# Patient Record
Sex: MALE | Race: WHITE
[De-identification: names, ages, dates, MRNs, and addresses within clinical notes are randomized per-mention and may not be internally consistent; named-entity substitution may affect disease eponyms.]

---

## 2023-08-12 ENCOUNTER — HOSPITAL ENCOUNTER (OUTPATIENT)
Dept: HOSPITAL 47 - EC | Age: 28
Setting detail: OBSERVATION
LOS: 2 days | Discharge: HOME | End: 2023-08-14
Attending: INTERNAL MEDICINE | Admitting: INTERNAL MEDICINE
Payer: COMMERCIAL

## 2023-08-12 DIAGNOSIS — F17.200: ICD-10-CM

## 2023-08-12 DIAGNOSIS — J93.83: Primary | ICD-10-CM

## 2023-08-12 DIAGNOSIS — R00.0: ICD-10-CM

## 2023-08-12 LAB
ALBUMIN SERPL-MCNC: 4.7 G/DL (ref 3.5–5)
ALP SERPL-CCNC: 70 U/L (ref 38–126)
ALT SERPL-CCNC: 55 U/L (ref 4–49)
ANION GAP SERPL CALC-SCNC: 7 MMOL/L
APTT BLD: 23.7 SEC (ref 22–30)
AST SERPL-CCNC: 52 U/L (ref 17–59)
BASOPHILS # BLD AUTO: 0 K/UL (ref 0–0.2)
BASOPHILS NFR BLD AUTO: 0 %
BUN SERPL-SCNC: 15 MG/DL (ref 9–20)
CALCIUM SPEC-MCNC: 9.4 MG/DL (ref 8.4–10.2)
CHLORIDE SERPL-SCNC: 100 MMOL/L (ref 98–107)
CO2 SERPL-SCNC: 30 MMOL/L (ref 22–30)
EOSINOPHIL # BLD AUTO: 0.1 K/UL (ref 0–0.7)
EOSINOPHIL NFR BLD AUTO: 1 %
ERYTHROCYTE [DISTWIDTH] IN BLOOD BY AUTOMATED COUNT: 5.36 M/UL (ref 4.3–5.9)
ERYTHROCYTE [DISTWIDTH] IN BLOOD: 12.7 % (ref 11.5–15.5)
GLUCOSE SERPL-MCNC: 93 MG/DL (ref 74–99)
HCT VFR BLD AUTO: 48.6 % (ref 39–53)
HGB BLD-MCNC: 17 GM/DL (ref 13–17.5)
INR PPP: 1 (ref ?–1.2)
LIPASE SERPL-CCNC: 74 U/L (ref 23–300)
LYMPHOCYTES # SPEC AUTO: 2.2 K/UL (ref 1–4.8)
LYMPHOCYTES NFR SPEC AUTO: 27 %
MAGNESIUM SPEC-SCNC: 2 MG/DL (ref 1.6–2.3)
MCH RBC QN AUTO: 31.7 PG (ref 25–35)
MCHC RBC AUTO-ENTMCNC: 35 G/DL (ref 31–37)
MCV RBC AUTO: 90.6 FL (ref 80–100)
MONOCYTES # BLD AUTO: 0.5 K/UL (ref 0–1)
MONOCYTES NFR BLD AUTO: 6 %
NEUTROPHILS # BLD AUTO: 5.4 K/UL (ref 1.3–7.7)
NEUTROPHILS NFR BLD AUTO: 65 %
PLATELET # BLD AUTO: 286 K/UL (ref 150–450)
POTASSIUM SERPL-SCNC: 4.3 MMOL/L (ref 3.5–5.1)
PROT SERPL-MCNC: 7.8 G/DL (ref 6.3–8.2)
PT BLD: 10.8 SEC (ref 9–12)
SODIUM SERPL-SCNC: 137 MMOL/L (ref 137–145)
WBC # BLD AUTO: 8.3 K/UL (ref 3.8–10.6)

## 2023-08-12 PROCEDURE — 71045 X-RAY EXAM CHEST 1 VIEW: CPT

## 2023-08-12 PROCEDURE — 85610 PROTHROMBIN TIME: CPT

## 2023-08-12 PROCEDURE — 36415 COLL VENOUS BLD VENIPUNCTURE: CPT

## 2023-08-12 PROCEDURE — 84484 ASSAY OF TROPONIN QUANT: CPT

## 2023-08-12 PROCEDURE — 96372 THER/PROPH/DIAG INJ SC/IM: CPT

## 2023-08-12 PROCEDURE — 71046 X-RAY EXAM CHEST 2 VIEWS: CPT

## 2023-08-12 PROCEDURE — 83735 ASSAY OF MAGNESIUM: CPT

## 2023-08-12 PROCEDURE — 85730 THROMBOPLASTIN TIME PARTIAL: CPT

## 2023-08-12 PROCEDURE — 96374 THER/PROPH/DIAG INJ IV PUSH: CPT

## 2023-08-12 PROCEDURE — 99285 EMERGENCY DEPT VISIT HI MDM: CPT

## 2023-08-12 PROCEDURE — 83690 ASSAY OF LIPASE: CPT

## 2023-08-12 PROCEDURE — 93005 ELECTROCARDIOGRAM TRACING: CPT

## 2023-08-12 PROCEDURE — 85025 COMPLETE CBC W/AUTO DIFF WBC: CPT

## 2023-08-12 PROCEDURE — 80053 COMPREHEN METABOLIC PANEL: CPT

## 2023-08-12 PROCEDURE — 94760 N-INVAS EAR/PLS OXIMETRY 1: CPT

## 2023-08-12 NOTE — ED
Chest Pain HPI





- General


Chief Complaint: Chest Pain


Stated Complaint: Chest Pain


Time Seen by Provider: 08/12/23 15:36


Source: patient


Mode of arrival: ambulatory


Limitations: no limitations





- History of Present Illness


Initial Comments: 


Al Grewal is a previously healthy 28-year-old male presents the emergency 

department today for evaluation of chest pain.  Patient reports that he was 

smoking a cigarette when he felt pain in his chest and epigastrium.  Pain was 

not associated with any palpitations, diaphoresis or lightheadedness.  He does 

report feeling somewhat short of breath.  He has no previous cardiac history.  

No previous medical history at all.  No significant family history.  No history 

of early heart disease, DVT PE in any relatives that he is aware of.  Patient 

reports that the discomfort in her shortness breath or says it is less intense.








- Related Data


                                Home Medications











 Medication  Instructions  Recorded  Confirmed


 


No Known Home Medications  08/12/23 08/12/23











                                    Allergies











Allergy/AdvReac Type Severity Reaction Status Date / Time


 


No Known Allergies Allergy   Verified 08/12/23 17:57














Review of Systems


ROS Statement: 


Those systems with pertinent positive or pertinent negative responses have been 

documented in the HPI.





ROS Other: All systems not noted in ROS Statement are negative.





EKG Findings





- EKG Comments:


EKG Findings:: EKG interpreted by me, EKG was obtained due to tachycardia chest 

pain EKG was obtained at 1539 rate is 110 rhythm is a narrow complex tachycardia

P-wave before each QRS consistent with a sinus tachycardia.  There is a 

rightward axis normal intervals  QRS 94  there is no acute ST 

elevations or depressions no evidence of acute ischemia or infarction.





Past Medical History


Past Medical History: No Reported History


History of Any Multi-Drug Resistant Organisms: None Reported


Past Surgical History: No Surgical Hx Reported


Past Psychological History: No Psychological Hx Reported


Smoking Status: Current every day smoker


Past Alcohol Use History: Occasional


Past Drug Use History: None Reported





General Exam





- General Exam Comments


Initial Comments: 


Physical Exam


GENERAL:


Patient is well-developed and well-nourished.  


Patient is nontoxic and well-hydrated and is in no distress.





HENT:


Normocephalic, Atraumatic. 





EYES:


PERRL, EOMI





PULMONARY:


Unlabored respirations.  


No audible rales rhonchi or wheezing was noted.





CARDIOVASCULAR:


Tachycardic, regular





ABDOMEN:


Soft and nontender with normal bowel sounds. 





SKIN:


Skin is clear with no lesions or rashes and otherwise unremarkable.





: 


Deferred





NEUROLOGIC:


Patient is alert and oriented x3.  


Moving all extremities spontaneously





MUSCULOSKELETAL:


Normal extremities with adequate strength and full range of motion.  


No lower extremity swelling or edema.  No calf tenderness.  





PSYCHIATRIC:


Normal psychiatric evaluation.





Limitations: no limitations





Course


                                   Vital Signs











  08/12/23 08/12/23 08/12/23





  15:30 16:47 18:00


 


Temperature 97.9 F  


 


Pulse Rate 126 H 93 82


 


Respiratory 18 20 18





Rate   


 


Blood Pressure 131/81 109/87 123/81


 


O2 Sat by Pulse 97 98 99





Oximetry   














  08/12/23 08/12/23 08/12/23





  18:15 18:30 18:45


 


Temperature   


 


Pulse Rate 80 99 101 H


 


Respiratory 18 4 L 18





Rate   


 


Blood Pressure 117/84 117/84 116/81


 


O2 Sat by Pulse 98 100 100





Oximetry   














  08/12/23 08/12/23 08/12/23





  19:00 19:10 19:19


 


Temperature   


 


Pulse Rate 85 72 85


 


Respiratory 15 17 18





Rate   


 


Blood Pressure 116/81 113/87 122/87


 


O2 Sat by Pulse   100





Oximetry   














  08/12/23 08/12/23 08/12/23





  19:30 19:40 19:50


 


Temperature   


 


Pulse Rate 71 73 68


 


Respiratory 15 15 21





Rate   


 


Blood Pressure 115/82 114/79 120/82


 


O2 Sat by Pulse 100 100 100





Oximetry   














  08/12/23





  20:00


 


Temperature 


 


Pulse Rate 72


 


Respiratory 15





Rate 


 


Blood Pressure 115/78


 


O2 Sat by Pulse 100





Oximetry 














Chest Pain MDM





- [facility]





Patient was seen and evaluated, history was obtained from patient 


Labs and xray were obtained


CXR with large right pneumothorax without evidence of tension





Patient was transported to resuscitation bay, placed on oxygen 6L NC


Patient consented for thoravent placement


Versed given for anxiolysis





Right sided thoravent placed


Xray confrimed placement and re-expansion of lung





Patient care discussed with Dr Serrano who accepts admission with consult to 

pulmonology





Patient updated on plan for admission














Was pt. sent in by a medical professional or institution (, PA, NP, urgent 

care, hospital, or nursing home...) When possible be specific


@  -[No]


Did you speak to anyone other than the patient for history (EMS, parent, family,

 police, friend...)? What history was obtained from this source 


@  -[No]


Did you review nursing and triage notes (agree or disagree)?  Why? 


@  -[I reviewed and agree with nursing and triage notes]


Were old charts reviewed (outside hosp., previous admission, EMS record, old 

EKG, old radiological studies, urgent care reports/EKG's, nursing home records)?

Report findings 


@  -[No old charts were reviewed]


Differential Diagnosis (chest pain, altered mental status, abdominal pain women,

abdominal pain men, vaginal bleeding, weakness, fever, dyspnea, syncope, hea

dache, dizziness, GI bleed, back pain, seizure, CVA, palpatations, mental 

health, musculoskeletal)? 


@  -Differential Chest Pain:


Stable Angina, Unstable Angina, STEMI, NSTEMI Aortic Dissection, Pneumothorax, 

Musculoskeletal, Esophageal Spasm GERD, Cholecystitis, Pancreatitis, Zoster, 

this is not meant to be an all-inclusive list. 


EKG interpreted by me (3pts min.).


@  -[As above]


X-rays interpreted by me (1pt min.).


@  -Large right-sided pneumothorax no signs of tension


CT interpreted by me (1pt min.).


@  -[None done]


U/S interpreted by me (1pt. min.).


@  -[None done]


What testing was considered but not performed or refused? (CT, X-rays, U/S, 

labs)? Why?


@  -[None]


What meds were considered but not given or refused? Why?


@  -Pain medication was declined


Did you discuss the management of the patient with other professionals 

(professionals i.e. , PA, NP, lab, RT, psych nurse, , , 

teacher, , )? Give summary


@  -Discussed with the admitting team


Was smoking cessation discussed for >3mins.?


@  -YES


Was critical care preformed (if so, how long)?


@  -[No]


Were there social determinants of health that impacted care today? How? 

(Homelessness, low income, unemployed, alcoholism, drug addiction, 

transportation, low edu. Level, literacy, decrease access to med. care, [facility], 

rehab)?


@  -[No]


Was there de-escalation of care discussed even if they declined (Discuss DNR or 

withdrawal of care, Hospice)? DNR status


@  -[No]


What co-morbidities impacted this encounter? (DM, HTN, Smoking, COPD, CAD, 

Cancer, CVA, ARF, Chemo, Hep., AIDS, mental health diagnosis, sleep apnea, 

morbid obesity)?


@  -Smoking


Was patient admitted / discharged? Hospital course, mention meds given and 

route, prescriptions, significant lab abnormalities, going to OR and other 

pertinent info.


@  -Admit


Undiagnosed new problem with uncertain prognosis?


@  -[No]


Drug Therapy requiring intensive monitoring for toxicity (Heparin, Nitro, 

Insulin, Cardizem)?


@  -[No]


Were any procedures done?


@  -Yes, door of a chest tube


Diagnosis/symptom?


@  -Spontaneous pneumothorax


Acute, or Chronic, or Acute on Chronic?


@  -Acute


Uncomplicated (without systemic symptoms) or Complicated (systemic symptoms)?


@  -Uncomplicated


Side effects of treatment?


@  -[No]


Exacerbation, Progression, or Severe Exacerbation?


@  -[No]


Poses a threat to life or bodily function? How? (Chest pain, [place], MI, pneumonia,

 PE, COPD, DKA, ARF, appy, cholecystitis, CVA, Diverticulitis, Homicidal, 

Suicidal, threat to staff... and all critical care pts)


@  -Yes, can become a tension pneumothorax resulting in hemodynamic instability 

and cardiac pulmonary arrest








Disposition


Clinical Impression: 


 Spontaneous pneumothorax





Disposition: ADMITTED AS IP TO [facility] [place]


Condition: Stable


Is patient prescribed a controlled substance at d/c from ED?: No

## 2023-08-12 NOTE — XR
EXAMINATION TYPE: XR chest 2V

 

DATE OF EXAM: 8/12/2023 5:42 PM

 

COMPARISON: None

 

TECHNIQUE: XR chest 2V .

 

CLINICAL INDICATION:Male, 28 years old with history of Chest Pain; 

 

FINDINGS: 

Lungs/Pleura: Large right-sided pneumothorax with near complete multilobar atelectasis. No evidence f
or tension physiology. Left lung is clear.

Pulmonary vascularity: Unremarkable.

Heart/mediastinum: Cardiomediastinal silhouette is unremarkable.

Musculoskeletal: No acute osseous pathology.

 

Findings communicated to Dr. Trista Daley DO on 8/12/2023 5:46 PM by Dr. Kymberly Long.

 

IMPRESSION: 

Large right pneumothorax without evidence for tension physiology.

## 2023-08-12 NOTE — XR
EXAMINATION TYPE: XR chest 2V

 

DATE OF EXAM: 8/12/2023

 

COMPARISON: Earlier today

 

HISTORY: 28-year-old male chest tube placement

 

TECHNIQUE:  AP and lateral views

 

FINDINGS:  

After right-sided thoracic vent placement, there is a residual trace right apical pneumothorax measur
ing 7 mm. Some patchy opacity at the right base likely atelectasis. No pleural effusion. Heart normal
 size.

 

 

IMPRESSION:  

Residual trace 7 mm right apical pneumothorax following Thoravent placement. Some patchy right basila
r opacity, likely atelectasis.

## 2023-08-13 RX ADMIN — ENOXAPARIN SODIUM SCH MG: 40 INJECTION SUBCUTANEOUS at 08:11

## 2023-08-13 NOTE — P.PN
Subjective


Progress Note Date: 08/13/23





No new complaints today.  Seen by pulmonology, added IS.  Ongoing monitoring.





Gen: awake, alert


HEENT: normocephalic, atraumatic, good hearing acuity, moist mucous membranes


Resp: good air exchange, breathing comfortably with no accessory muscle use


CVS: good distal perfusion x 4,


GI: soft, NTTP, ND


: no SPT, no CVAT, elliott catheter not present


MSK: no pitting edema, no clubbing


Neuro: non-focal, moving all extremities


Psych: cooperative, euthymic mood





Hospital Course:





The patient is a 28-year-old male with no known PMH who had presented to the 

emergency room with complaints of chest pain.  In the emergency room, chest x-

ray revealed a large right-sided pneumothorax without evidence of tension.  A 

thora-vent was placed by the ED provider with subsequent chest x-ray showing 

residual trace 7 mm right apical pneumothorax.  EKG upon presentation had 

revealed sinus tachycardia at 110 bpm.  Laboratory evaluation in the emergency 

room was unremarkable except for an ALT of 55. 





Assessment:





Spontaneous Pneumothorax


Nicotine Dependence





Plan:





Status post right sided thora-vent placement


Patient advised on importance of smoking cessation


Pulmonary consulted





DVT prophylaxis: Lovenox Subq





Objective





- Vital Signs


Vital signs: 


                                   Vital Signs











Temp  98.0 F   08/13/23 07:00


 


Pulse  65   08/13/23 07:00


 


Resp  16   08/13/23 07:00


 


BP  108/69   08/13/23 07:00


 


Pulse Ox  97   08/13/23 08:26


 


FiO2      








                                 Intake & Output











 08/12/23 08/13/23 08/13/23





 18:59 06:59 18:59


 


Intake Total   118


 


Balance   118


 


Weight 63.503 kg 63.503 kg 


 


Intake:   


 


  Oral   118


 


Other:   


 


  # Voids  1 














- Labs


CBC & Chem 7: 


                                 08/12/23 16:45





                                 08/12/23 16:45


Labs: 


                  Abnormal Lab Results - Last 24 Hours (Table)











  08/12/23 Range/Units





  16:45 


 


ALT  55 H  (4-49)  U/L

## 2023-08-13 NOTE — P.CNPUL
History of Present Illness


Consult date: 08/13/23


Requesting physician: Víctor Lew


Reason for consult: abnormal CXR/CT


Chief complaint: Right-sided chest pain


History of present illness: 





This is a very pleasant 28-year-old male patient with no significant past 

medical history.  He is does have chronic and ongoing tobacco dependence.  

Patient states he was sitting up at his bedside and is having a cigarette when 

he felt sudden onset of right-sided chest pain and abdominal discomfort recently

here to the emergency room yesterday for the same.  He was found have a large 

right-sided pneumothorax without evidence of tension.  A thoravent catheter was 

placed in the emergency department.  White count 8.3.  Hemoglobin 17.0.  

Platelets 286.  Sodium 137.  Potassium 4.3.  Bicarb 30.  BUN 15.  Creatinine 

0.88.  AST 52.  ALT 55.  Troponin negative 1.  He is seen today in consultation

on the regular medical floor.  He is currently sitting up in bed.  Awake and 

alert in no acute distress.  Right-sided thoracotomy remains in place to 

Pleur-evac to low continuous wall suction.  No leak. His follow up chest x-ray 

shows a tiny right apical pneumothorax.  He denies any worsening shortness of 

breath, cough or congestion.  Less discomfort in the right chest.  Maintaining 

good O2 saturations up to 100% on room air.  Afebrile.  Hemodynamically stable. 





Review of Systems





REVIEW OF SYSTEMS:


CONSTITUTIONAL: Denies any recent significant weight loss or weight gain.


EYES: Denies change in vision.


EARS, NOSE, MOUTH, THROAT: Denies headaches, denies sore throat.


CARDIOVASCULAR: Positive for right-sided chest pain, no palpitations or syncopal

episodes.


RESPIRATORY: Positive for shortness of breath, no cough, congestion or 

hemoptysis.


GASTROINTESTINAL: Denies change in appetite, denies abdominal pain


GENITOURINARY: Denies hematuria, denies infections.


MUSKULOSKELETAL: Denies pain, denies swelling.


INTEGUMENTARY: Denies rash, denies eczema.


NEUROLOGICAL: Denies recent memory loss, no recent seizure activity. 


PSYCHIATRIC: Denies anxiety, denies depression.


HEMATOLOGIC/LYMPHATIC: Denies anemia, denies enlarged lymph nodes.








Past Medical History


Past Medical History: No Reported History


History of Any Multi-Drug Resistant Organisms: None Reported


Past Surgical History: No Surgical Hx Reported


Past Psychological History: No Psychological Hx Reported


Smoking Status: Current every day smoker


Past Alcohol Use History: Occasional


Past Drug Use History: None Reported





- Past Family History


  ** Father


Family Medical History: No Reported History (patient reports family is healthy)





Medications and Allergies


                                Home Medications











 Medication  Instructions  Recorded  Confirmed  Type


 


No Known Home Medications  08/12/23 08/12/23 History








                                    Allergies











Allergy/AdvReac Type Severity Reaction Status Date / Time


 


No Known Allergies Allergy   Verified 08/12/23 17:57














Physical Exam


Vitals: 


                                   Vital Signs











  Temp Pulse Pulse Resp BP BP Pulse Ox


 


 08/13/23 08:26        97


 


 08/13/23 07:00  98.0 F   65  16   108/69  97


 


 08/13/23 02:45  98.1 F   57 L  16   118/75  100


 


 08/13/23 02:15     16   


 


 08/12/23 21:17  98.0 F   59 L  16   115/74  100


 


 08/12/23 20:40   68   15  122/83   100


 


 08/12/23 20:30   75   20  115/77  


 


 08/12/23 20:20   69   17  115/77   100


 


 08/12/23 20:00   72   15  115/78   100


 


 08/12/23 19:50   68   21  120/82   100


 


 08/12/23 19:40   73   15  114/79   100


 


 08/12/23 19:30   71   15  115/82   100


 


 08/12/23 19:19   85   18  122/87   100


 


 08/12/23 19:10   72   17  113/87  


 


 08/12/23 19:00   85   15  116/81  


 


 08/12/23 18:45   101 H   18  116/81   100


 


 08/12/23 18:30   99   4 L  117/84   100


 


 08/12/23 18:15   80   18  117/84   98


 


 08/12/23 18:00   82   18  123/81   99


 


 08/12/23 16:47   93   20  109/87   98


 


 08/12/23 15:30  97.9 F  126 H   18  131/81   97








                                Intake and Output











 08/12/23 08/13/23 08/13/23





 22:59 06:59 14:59


 


Intake Total   118


 


Balance   118


 


Intake:   


 


  Oral   118


 


Other:   


 


  # Voids 1 1 


 


  Weight 63.503 kg  














GENERAL EXAM: Alert, very pleasant 28-year-old male patient, on room air, 

comfortable in no apparent distress.


HEAD: Normocephalic.


EYES: Normal reaction of pupils, equal size.


NOSE: Clear with pink turbinates.


THROAT: No erythema or exudates.


NECK: No masses, no JVD.


CHEST: No chest wall deformity.  Right sided Thora-Vent catheter in place.


LUNGS: Equal air entry with no crackles, wheeze, rhonchi or dullness.


CVS: S1 and S2 normal with no audible murmur, regular rhythm.


ABDOMEN: No hepatosplenomegaly, normal bowel sounds, no guarding or rigidity.


SPINE: No scoliosis or deformity


SKIN: No rashes


CENTRAL NERVOUS SYSTEM: No focal deficits, tone is normal in all 4 extremities.


EXTREMITIES: There is no peripheral edema.  No clubbing, no cyanosis.  

Peripheral pulses are intact.





Results





- Laboratory Findings


CBC and BMP: 


                                 08/12/23 16:45





                                 08/12/23 16:45


PT/INR, D-dimer











PT  10.8 sec (9.0-12.0)   08/12/23  16:45    


 


INR  1.0  (<1.2)   08/12/23  16:45    








Abnormal lab findings: 


                                  Abnormal Labs











  08/12/23





  16:45


 


ALT  55 H














- Diagnostic Findings


Chest x-ray: image reviewed





Assessment and Plan


Assessment: 





Right-sided chest pain secondary to spontaneous pneumothorax.  Status post 

Thora-Vent placement on 08/12/2023





Chronic and ongoing tobacco dependence





Plan:





The patient was seen and evaluated


Chest x-ray, labs and medications reviewed


Thora vent remains in place


Add incentive spirometer


Educated regarding the importance of complete smoking cessation


NicoDerm patch will be offered


Follow-up chest x-ray in a.m.


We will continue to follow and make further recommendations based on his 

clinical status





I have personally seen and examined the patient, performed the documentation and

the assessment and plan as written.  Number of minutes spent on the visit: 20.

## 2023-08-13 NOTE — P.HPIM
History of Present Illness


H&P Date: 08/12/23





The patient is a 28-year-old male with no known PMH who had presented to the 

emergency room with complaints of chest pain.  The patient reports that he was 

in his usual state of health until this afternoon when while he was smoking a 

cigarette he suddenly developed chest discomfort and shortness of breath.  He 

denied experiencing diaphoresis, nausea, vomiting, or palpitations.  Patient 

denied recent travel or history of PEs or MI.  Also denied any family history of

cardiac disease.  Patient denied history of pneumothorax.  Patient reports that 

his chest pain had resolved at the time of interview when he felt that his 

baseline.





In the emergency room, chest x-ray revealed a large right-sided pneumothorax 

without evidence of tension.  A thora-vent was placed by the ED provider with 

subsequent chest x-ray showing residual trace 7 mm right apical pneumothorax.  

EKG upon presentation had revealed sinus tachycardia at 110 bpm.  Laboratory 

evaluation in the emergency room was unremarkable except for an ALT of 55. 

Patient denied hitory of trauma.





ED documentation reviewed and case discussed with ED provider. 





Review of systems:


Pertinent positives and negatives as discussed in HPI, a complete review of 

systems was performed and all other systems are negative.





Physical examination:


Vital signs reviewed


General: non toxic, no distress, appears at stated age, normal weight


Derm: no unusual rashes/lesions, warm


Head: atraumatic, normocephalic, symmetric


Eyes: EOMI, no lid lag, anicteric sclera, pupils equal round reactive to light


ENT: Nose and ears atraumatic


Neck: No cervical lymphadenopathy, trachea midline, supple


Mouth: no lip lesion, mucus membranes moist


Cardiovascular: S1S2 reg, no murmur, positive dorsalis pedis pulse bilateral, no

edema, R sided thoravent noted


Lungs: CTA bilateral, no rhonchi, no rales, no accessory muscle use


Abdominal: soft,  nontender to palpation, no guarding


Ext: muscle strength 5 out of 5 in all 4 extremities grossly, no gross muscle 

atrophy, no contractures, 


Neuro:  CN II-XI grossly intact, no gross focal neuro deficits


Psych: Alert, oriented, appropriate affect 





Assessment:





Spontaneous pneumothorax





Imaging:


In the emergency room, chest x-ray revealed a large right-sided pneumothorax 

without evidence of tension.





Data Review:


Laboratory evaluation in the emergency room was unremarkable except for an ALT 

of 55.





Plan:





Status post right sided thora-vent placement


Patient advised on importance of smoking cessation


Pulmonary consulted





DVT prophylaxis: Lovenox Subq





The patient is admitted with an anticipated less than 2 midnight stay for 

evaluation of pneumothorax


CODE STATUS: Full Code


Discussed with: Patient


Anticipated discharge place: Home








Past Medical History


Past Medical History: No Reported History


History of Any Multi-Drug Resistant Organisms: None Reported


Past Surgical History: No Surgical Hx Reported


Past Psychological History: No Psychological Hx Reported


Smoking Status: Current every day smoker


Past Alcohol Use History: Occasional


Past Drug Use History: None Reported





- Past Family History


  ** Father


Family Medical History: No Reported History (patient reports family is healthy)





Medications and Allergies


                                Home Medications











 Medication  Instructions  Recorded  Confirmed  Type


 


No Known Home Medications  08/12/23 08/12/23 History








                                    Allergies











Allergy/AdvReac Type Severity Reaction Status Date / Time


 


No Known Allergies Allergy   Verified 08/12/23 17:57














Physical Exam


Vitals: 


                                   Vital Signs











  Temp Pulse Resp BP Pulse Ox


 


 08/12/23 20:40   68  15  122/83  100


 


 08/12/23 20:30   75  20  115/77 


 


 08/12/23 20:20   69  17  115/77  100


 


 08/12/23 20:00   72  15  115/78  100


 


 08/12/23 19:50   68  21  120/82  100


 


 08/12/23 19:40   73  15  114/79  100


 


 08/12/23 19:30   71  15  115/82  100


 


 08/12/23 19:19   85  18  122/87  100


 


 08/12/23 19:10   72  17  113/87 


 


 08/12/23 19:00   85  15  116/81 


 


 08/12/23 18:45   101 H  18  116/81  100


 


 08/12/23 18:30   99  4 L  117/84  100


 


 08/12/23 18:15   80  18  117/84  98


 


 08/12/23 18:00   82  18  123/81  99


 


 08/12/23 16:47   93  20  109/87  98


 


 08/12/23 15:30  97.9 F  126 H  18  131/81  97








                                Intake and Output











 08/12/23 08/12/23 08/12/23





 06:59 14:59 22:59


 


Other:   


 


  Weight   63.503 kg














Results


CBC & Chem 7: 


                                 08/12/23 16:45





                                 08/12/23 16:45


Labs: 


                  Abnormal Lab Results - Last 24 Hours (Table)











  08/12/23 Range/Units





  16:45 


 


ALT  55 H  (4-49)  U/L

## 2023-08-14 VITALS — RESPIRATION RATE: 18 BRPM

## 2023-08-14 VITALS — HEART RATE: 54 BPM

## 2023-08-14 VITALS — DIASTOLIC BLOOD PRESSURE: 68 MMHG | SYSTOLIC BLOOD PRESSURE: 101 MMHG | TEMPERATURE: 97.4 F

## 2023-08-14 RX ADMIN — ENOXAPARIN SODIUM SCH: 40 INJECTION SUBCUTANEOUS at 12:31

## 2023-08-14 NOTE — P.PN
Subjective


Progress Note Date: 08/14/23





This is a very pleasant 28-year-old male patient with no significant past 

medical history.  He is does have chronic and ongoing tobacco dependence.  

Patient states he was sitting up at his bedside and is having a cigarette when 

he felt sudden onset of right-sided chest pain and abdominal discomfort recently

here to the emergency room yesterday for the same.  He was found have a large 

right-sided pneumothorax without evidence of tension.  A thoravent catheter was 

placed in the emergency department.  White count 8.3.  Hemoglobin 17.0.  

Platelets 286.  Sodium 137.  Potassium 4.3.  Bicarb 30.  BUN 15.  Creatinine 

0.88.  AST 52.  ALT 55.  Troponin negative 1.  He is seen today in consultation

on the regular medical floor.  He is currently sitting up in bed.  Awake and 

alert in no acute distress.  Right-sided thoracotomy remains in place to Pleur-

evac to low continuous wall suction.  No leak. His follow up chest x-ray shows a

tiny right apical pneumothorax.  He denies any worsening shortness of breath, 

cough or congestion.  Less discomfort in the right chest.  Maintaining good O2 

saturations up to 100% on room air.  Afebrile.  Hemodynamically stable. 





The patient is seen today in 08/14/2023 in follow-up on the regular medical 

floor.  He is currently resting comfortably in bed.  Awake and alert in no acute

distress.  Maintaining O2 saturations in the 90s on room air.  Today's chest x-

ray reveals evidence of right thoracotomy tube in position.  No evidence of 

pneumothorax.  He continues to work well with the incentive spirometer.  His 

pain is well controlled.  Lovenox for DVT prophylaxis.





Objective





- Vital Signs


Vital signs: 


                                   Vital Signs











Temp  97.2 F L  08/14/23 07:00


 


Pulse  54 L  08/14/23 07:00


 


Resp  18   08/14/23 07:00


 


BP  107/66   08/14/23 07:00


 


Pulse Ox  98   08/14/23 07:00


 


FiO2      








                                 Intake & Output











 08/13/23 08/14/23 08/14/23





 18:59 06:59 18:59


 


Intake Total 236  120


 


Balance 236  120


 


Intake:   


 


  Oral 236  120


 


Other:   


 


  Voiding Method  Toilet 


 


  # Voids 3 1 


 


  # Bowel Movements 0  














- Exam





GENERAL EXAM: Alert, active, 28-year-old male, on room air, comfortable in no 

apparent distress.


HEAD: Normocephalic.


EYES: Normal reaction of pupils, equal size.


NOSE: Clear with pink turbinates.


THROAT: No erythema or exudates.


NECK: No masses, no JVD.


CHEST: No chest wall deformity.  Right sided Thora-Vent in place.


LUNGS: Equal air entry with no crackles, wheeze, rhonchi or dullness.


CVS: S1 and S2 normal with no audible murmur, regular rhythm.


ABDOMEN: No hepatosplenomegaly, normal bowel sounds, no guarding or rigidity.


SPINE: No scoliosis or deformity


SKIN: No rashes


CENTRAL NERVOUS SYSTEM: No focal deficits, tone is normal in all 4 extremities.


EXTREMITIES: There is no peripheral edema.  No clubbing, no cyanosis.  

Peripheral pulses are intact.





- Labs


CBC & Chem 7: 


                                 08/12/23 16:45





                                 08/12/23 16:45





Assessment and Plan


Assessment: 





Right-sided chest pain secondary to spontaneous pneumothorax.  Status post 

Thora-Vent placement on 08/12/2023





Chronic and ongoing tobacco dependence





Plan:





The patient was seen and evaluated


Chest x-ray, medications reviewed


Thora vent clamped this a.m.


Follow-up chest x-ray reveals no pneumothorax


Plan is to remove Thora vent today


Cleared for discharge today


Continue incentive spirometer


Educated regarding the importance of complete smoking cessation


Follow-up in our office in 1 week





I have personally seen and examined the patient, performed the documentation and

the assessment and plan as written.  Number of minutes spent on the visit: 10.

## 2023-08-14 NOTE — P.DS
Providers


Date of admission: 


08/12/23 19:04





Expected date of discharge: 08/14/23


Attending physician: 


Carlos Serrano MD





Consults: 





                                        





08/12/23 19:03


Consult Physician Stat 


   Consulting Provider: Isidro Lechuga Reason/Comments: spontaneous pneumothorax


   Do you want consulting provider notified?: Already Contacted











Primary care physician: 


Stated None





Hospital Course: 





Assessment:





Spontaneous Pneumothorax


Nicotine Dependence





Hospital Course:





The patient is a 28-year-old male with no known PMH who had presented to the 

emergency room with complaints of chest pain.  In the emergency room, chest x-

ray revealed a large right-sided pneumothorax without evidence of tension.  A 

thora-vent was placed by the ED provider with subsequent chest x-ray showing 

residual trace 7 mm right apical pneumothorax.  EKG upon presentation had 

revealed sinus tachycardia at 110 bpm.  Laboratory evaluation in the emergency 

room was unremarkable except for an ALT of 55. Pt was monitored for two days 

which thoravent in place.  It was pulled out on 8/14 by pulmonology without 

event.  Pts repeat CXRs showed re-expansion of right lung.  He was discharged 

home with pulmonology f/u.











Gen: awake, alert


HEENT: normocephalic, atraumatic, good hearing acuity, moist mucous membranes


Resp: good air exchange, breathing comfortably with no accessory muscle use


CVS: good distal perfusion x 4,


GI: soft, NTTP, ND


: no SPT, no CVAT, elliott catheter not present


MSK: no pitting edema, no clubbing


Neuro: non-focal, moving all extremities


Psych: cooperative, euthymic mood














Patient Condition at Discharge: Good





Plan - Discharge Summary


New Discharge Prescriptions: 


No Action


   No Known Home Medications 


Discharge Medication List





No Known Home Medications  08/12/23 [History]








Follow up Appointment(s)/Referral(s): 


Zach Amador DO [Doctor of Osteopathic Medicine] - 09/06/23 9:00 am


None,Stated [Primary Care Provider] - 1-2 days


Patient Instructions/Handouts:  Spontaneous Pneumothorax (DC)


Discharge Disposition: HOME SELF-CARE

## 2023-08-14 NOTE — XR
EXAMINATION TYPE: XR chest 1V portable

 

DATE OF EXAM: 8/14/2023 8:23 AM

 

COMPARISON: Chest radiographs from 8/14/2023

 

TECHNIQUE: XR chest 1V portable Portable AP radiograph of the chest.

 

CLINICAL INDICATION:Male, 28 years old with history of Pneumothorax; 

 

FINDINGS: 

Lungs/Pleura: There is no evidence of pleural effusion, focal consolidation, or pneumothorax.  

Pulmonary vascularity: Unremarkable.

Heart/mediastinum: Cardiomediastinal silhouette is unremarkable.

Musculoskeletal: No acute osseous pathology.

 

Other findings: None

 

Lines/Tubes:

Right thoracotomy tube in stable position.

 

IMPRESSION: 

Right thoracotomy tube without evidence of pneumothorax.

## 2023-08-14 NOTE — XR
EXAMINATION TYPE: XR chest 1V portable

 

DATE OF EXAM: 8/14/2023

 

COMPARISON: 8/14/2023 at 10:31 AM

 

HISTORY: Right-sided pneumothorax.

 

TECHNIQUE: Single frontal view of the chest is obtained.

 

IMPRESSION:

 

The previously seen right-sided chest tube has been removed. No pneumothorax is seen.

 

The lungs are clear.

 

The cardiac silhouette and pulmonary vessels are within normal limits.

## 2023-08-14 NOTE — XR
EXAMINATION TYPE: XR chest 1V portable

 

DATE OF EXAM: 8/14/2023 7:15 AM

 

COMPARISON: Chest radiographs from 8/12/2023

 

TECHNIQUE: XR chest 1V portable Frontal view of the chest.

 

CLINICAL INDICATION:Male, 28 years old with history of Pneumothorax; 

 

FINDINGS: 

Lungs/Pleura: There is no evidence of pleural effusion, focal consolidation, or pneumothorax.  

Pulmonary vascularity: Unremarkable.

Heart/mediastinum: Cardiomediastinal silhouette is unremarkable.

Musculoskeletal: No acute osseous pathology.

 

Right thoracotomy tube without evidence of pneumothorax.

 

IMPRESSION: 

Right thoracotomy tube without evidence of pneumothorax.

## 2023-08-14 NOTE — XR
EXAMINATION TYPE: XR chest 1V portable

 

DATE OF EXAM: 8/14/2023 at 10:34 AM.

 

COMPARISON: 8/14/2023 at

 

HISTORY: Right-sided pneumothorax.

 

TECHNIQUE: Single frontal view of the chest is obtained.

 

FINDINGS:  Right-sided chest tube is unchanged. There is no focal air space opacity, pleural effusion
, or pneumothorax seen.  The cardiac silhouette size is within normal limits.   The osseous structure
s are intact.

 

IMPRESSION:  Unchanged right chest tube with no pneumothorax seen at this time.